# Patient Record
(demographics unavailable — no encounter records)

---

## 2025-06-09 NOTE — PHYSICAL EXAM
[General Appearance - Well Developed] : well developed [General Appearance - Well Nourished] : well nourished [de-identified] : bilateral descended testicles 16cc, left grade 3 varicocele, vas palpable

## 2025-06-09 NOTE — ASSESSMENT
[FreeTextEntry1] : 35 year old male with left varicocele, scrotal pain and infertility  - Pain is classic for varicocele pain - Infertility likely multifactorial from intermittent steroid abuse and varicocele - Recommend varicocele repair. Risks including bleeding and infection discussed. Postop recovery and expectations discussed - Will get SA 3-6 months after varicocele repair - Schedule for 6/25

## 2025-06-09 NOTE — HISTORY OF PRESENT ILLNESS
[FreeTextEntry1] : 35 year old male with left scrotal pain and infertility   Saw Dr. Pittman 3/24 for this  Of note, patient was using anabolic steroids at that time for bodybuilding  3/24: Testosterone >1500. LH < 0.3. FSH < 0.3. Prolactin 12.5   Has had left sided scrotal pain for 6 years Intermittent Worse as day goes on, achy, heaviness No right pain  No longer on anabolic steroids. Last time 3 months ago Done with using it  Regular heat exposure to groin: no Any fevers in the last 3 months: no Did you undergo puberty around the same time as peers: yes Tobacco/marijuana/drug use: daily vaping Alcohol use: no Genital infection/STD hx: no Hx of UDT or trauma to testicles: no  Medical hx: no Relevant surgical hx: no  Prior SA: no   Occupation: unemployed currently Environmental exposures: no    ROS: Erection rigidity: normal Orgasm/ejaculation: normal Libido: normal Voiding sx: normal

## 2025-07-03 NOTE — HISTORY OF PRESENT ILLNESS
[FreeTextEntry1] :  Dear Dr. Abdi,   Thank you so much for the referral to help care for your patient.  Chief Complaint:  left scrotal pain and infertility  Date of first visit: 7/3/25  Peggy Gilman is a 35-year-old Romans[place] man with PMH of Hypothyroidism who presents for varicocele embolization. He's had intermittent left scrotal pain x 6 years, pain and heaviness is worst at end of day.  Has been  for 12 years, sexually active, no contraceptive use and wife has not gotten pregnant. Per patient, wife had full work up, all negative. Presents today to discuss left varicocele embolization.  6/25/25 left microsurgical varicocelectomy with Dr. Abdi. Due to the patient's anatomy, there was a conglomeration of veins and artery in the medial aspect that we were unable to tease out.  Decision was made to leave these in place without trying to ligate more veins in order to preserve the artery.   Denies family hx of  cancers.   Hx of anabolic steroids for bodybuilding. No longer on this.   Scrotal US in office 7/3/25 5.2mm left varicocele  3/2024 Testosterone >1500  LH < 0.3 FSH < 0.3  Prolactin 12.5  SocHx:  vaping occasionally, no cigarettes no alcohol no illicit drug use   Allergies:   NKDA   The patient denies fevers, chills, nausea and or vomiting and no unexplained weight loss.  All pertinent laboratory, films and physician notes were reviewed. Questionnaire results were discussed with patient.

## 2025-07-03 NOTE — ASSESSMENT
[FreeTextEntry1] : 35-year-old [place] man who presents for varicocele embolization. He's had intermittent left scrotal pain x 6 years, pain and heaviness is worst at end of day.   Needs an appointment for consult with Dr. Hernandez. Schedule left varicocele embolization, he will update medical clearance.  Semen Analysis ordered. Rx given to patient  We discussed disease process and treatment options for symptomatic varicoceles and infertility. The patient understands the scrotal pain may resolve the heaviness that he feels in his scrotum however improving his fertility is not 100% guaranteed. There are studies that show there is improvement in semen parameters after treatment of the varicocele.  We discussed the risks and benefits alternatives associated with gonadal embolization regards to nontarget embolization complications of access and migration of coils. We also discussed the success rates of procedures in comparison to surgery.  We had an in-depth conversation regarding the benefit of varicocelectomy today. He understands the literature which overwhelming reports a benefit in improvement in semen parameters. He also understands that there is limited data in terms of spontaneous pregnancy and take-home baby rates. There is good data suggesting improved semen parameters which can possibly obviate the need for future IVF and has been shown to improve IVF outcomes in selected patients. We discussed the recent results of a [facility] review which reported that one natural pregnancy is achieved for every 3-5 varicocele repairs over an unknown time frame. We also spoke about the fact that there is a 3-to-6-month delay before improvement is seen in semen parameters. The role of advancing maternal age was discussed in depth.  Surgical risks, including a risk of infection, injury to the testicular artery (extremely rare), injury to the vas deferens and hydrocele were discussed, as was post operative pain and pain control. Post operative skin numbness in the anterior thigh/lateral scrotum were also discussed.  1. Motrin 800 mg po BID start of the procedure 2. Labs C,7 done  3. Follow up 1 month after procedure 4. 4-6 months US and Semen analysis (69 days for new sperm to mature)  Thank you very much for allowing me to assist in the care of this patient. Please do not hesitate to contact me with any additional questions or concerns.